# Patient Record
Sex: MALE | Race: WHITE | HISPANIC OR LATINO | Employment: OTHER | URBAN - METROPOLITAN AREA
[De-identification: names, ages, dates, MRNs, and addresses within clinical notes are randomized per-mention and may not be internally consistent; named-entity substitution may affect disease eponyms.]

---

## 2024-05-13 ENCOUNTER — OFFICE VISIT (OUTPATIENT)
Dept: PODIATRY | Facility: CLINIC | Age: 74
End: 2024-05-13
Payer: MEDICARE

## 2024-05-13 VITALS — WEIGHT: 210 LBS | BODY MASS INDEX: 26.95 KG/M2 | HEIGHT: 74 IN

## 2024-05-13 DIAGNOSIS — L60.9 DISEASE OF NAIL: Primary | ICD-10-CM

## 2024-05-13 PROCEDURE — 1159F MED LIST DOCD IN RCRD: CPT | Mod: CPTII,S$GLB,, | Performed by: PODIATRIST

## 2024-05-13 PROCEDURE — 99203 OFFICE O/P NEW LOW 30 MIN: CPT | Mod: S$GLB,,, | Performed by: PODIATRIST

## 2024-05-13 PROCEDURE — 1126F AMNT PAIN NOTED NONE PRSNT: CPT | Mod: CPTII,S$GLB,, | Performed by: PODIATRIST

## 2024-05-13 RX ORDER — FLUTICASONE PROPIONATE AND SALMETEROL 100; 50 UG/1; UG/1
POWDER RESPIRATORY (INHALATION)
COMMUNITY
Start: 2023-10-23 | End: 2024-10-23

## 2024-05-13 RX ORDER — LATANOPROST 50 UG/ML
1 SOLUTION/ DROPS OPHTHALMIC NIGHTLY
COMMUNITY
Start: 2024-05-03

## 2024-05-13 RX ORDER — POLYVINYL ALCOHOL, POVIDONE 14; 6 MG/ML; MG/ML
SOLUTION/ DROPS OPHTHALMIC
COMMUNITY
Start: 2024-02-16

## 2024-05-13 RX ORDER — CARBOXYMETHYLCELLULOSE SODIUM 10 MG/ML
GEL OPHTHALMIC
COMMUNITY
Start: 2024-05-03

## 2024-05-13 RX ORDER — CICLOPIROX 80 MG/ML
SOLUTION TOPICAL NIGHTLY
Qty: 6.6 ML | Refills: 2 | Status: SHIPPED | OUTPATIENT
Start: 2024-05-13 | End: 2024-08-11

## 2024-05-13 RX ORDER — MONTELUKAST SODIUM 10 MG/1
10 TABLET ORAL
COMMUNITY
Start: 2024-02-26

## 2024-05-13 RX ORDER — BRIMONIDINE TARTRATE 2 MG/ML
SOLUTION/ DROPS OPHTHALMIC
COMMUNITY
Start: 2024-05-03

## 2024-05-13 RX ORDER — FLUOROMETHOLONE 1 MG/ML
SUSPENSION/ DROPS OPHTHALMIC
COMMUNITY
Start: 2024-05-03

## 2024-05-13 RX ORDER — DOXYCYCLINE 50 MG/1
50 CAPSULE ORAL 2 TIMES DAILY
COMMUNITY
Start: 2024-01-26

## 2024-05-13 RX ORDER — TIMOLOL MALEATE 5 MG/ML
1 SOLUTION/ DROPS OPHTHALMIC 2 TIMES DAILY
COMMUNITY
Start: 2024-05-03

## 2024-05-13 RX ORDER — PRAZOSIN HYDROCHLORIDE 1 MG/1
CAPSULE ORAL
COMMUNITY
Start: 2023-10-23

## 2024-05-13 RX ORDER — CETIRIZINE HYDROCHLORIDE 10 MG/1
TABLET ORAL
COMMUNITY
Start: 2023-10-23 | End: 2024-10-23

## 2024-05-13 RX ORDER — ACETAZOLAMIDE 250 MG/1
500 TABLET ORAL 2 TIMES DAILY
COMMUNITY
Start: 2024-02-29

## 2024-05-13 RX ORDER — DICLOFENAC SODIUM 10 MG/G
GEL TOPICAL 4 TIMES DAILY
COMMUNITY
Start: 2024-01-26

## 2024-05-13 RX ORDER — LIFITEGRAST 50 MG/ML
SOLUTION/ DROPS OPHTHALMIC
COMMUNITY
Start: 2024-01-29

## 2024-05-13 RX ORDER — COLCHICINE 0.6 MG/1
CAPSULE ORAL
COMMUNITY
Start: 2023-12-15

## 2024-05-13 RX ORDER — ALBUTEROL SULFATE 90 UG/1
AEROSOL, METERED RESPIRATORY (INHALATION)
COMMUNITY
Start: 2023-10-23

## 2024-05-13 RX ORDER — MOXIFLOXACIN 5 MG/ML
SOLUTION/ DROPS OPHTHALMIC
COMMUNITY
Start: 2024-02-16

## 2024-05-13 RX ORDER — PREDNISOLONE ACETATE 10 MG/ML
SUSPENSION/ DROPS OPHTHALMIC
COMMUNITY
Start: 2024-01-26

## 2024-05-13 RX ORDER — METHYLPREDNISOLONE 4 MG/1
TABLET ORAL
COMMUNITY
Start: 2024-01-11

## 2024-05-13 RX ORDER — MELOXICAM 7.5 MG/1
7.5 TABLET ORAL
COMMUNITY
Start: 2024-04-11

## 2024-06-01 RX ORDER — DEXAMETHASONE SODIUM PHOSPHATE 4 MG/ML
4 INJECTION, SOLUTION INTRA-ARTICULAR; INTRALESIONAL; INTRAMUSCULAR; INTRAVENOUS; SOFT TISSUE
Status: DISCONTINUED | OUTPATIENT
Start: 2024-05-13 | End: 2024-06-02

## 2024-06-01 RX ORDER — LIDOCAINE HYDROCHLORIDE 10 MG/ML
1 INJECTION INFILTRATION; PERINEURAL
Status: DISCONTINUED | OUTPATIENT
Start: 2024-05-13 | End: 2024-06-02

## 2024-06-02 NOTE — PROGRESS NOTES
Subjective:     Patient ID: Gilson Cooper is a 73 y.o. male.    Chief Complaint: Nail Problem    Gilson is a 73 y.o. male who presents to the clinic complaining of thick and discolored toenails on both feet. Gilson is inquiring about treatment options.    Review of Systems   Constitutional: Negative for chills and fever.   Cardiovascular:  Negative for chest pain and leg swelling.   Respiratory:  Negative for cough and shortness of breath.    Gastrointestinal:  Negative for diarrhea, nausea and vomiting.        Objective:     Physical Exam  Vitals reviewed.   Constitutional:       General: He is not in acute distress.     Appearance: Normal appearance. He is not ill-appearing.   HENT:      Head: Normocephalic.      Nose: Nose normal.   Pulmonary:      Effort: Pulmonary effort is normal. No respiratory distress.   Skin:     Capillary Refill: Capillary refill takes 2 to 3 seconds.   Neurological:      Mental Status: He is alert and oriented to person, place, and time.   Psychiatric:         Mood and Affect: Mood normal.         Behavior: Behavior normal.         Thought Content: Thought content normal.     Neurologic: Protective and light touch sensation intact bilateral lower extremity   Dermatologic:  Left foot nail plates 1 through 5 bilateral are thickened and discolored, no open lesions noted bilateral foot   Vascular: DP and PT pulses palpable 2/4 bilateral foot, capillary fill time less 3 seconds to distal digits bilateral foot   Musculoskeletal: No pain and tenderness with palpation bilateral foot, ankle joint range of motion is mildly reduced without pain, no masses noted bilateral foot      Assessment:      Encounter Diagnosis   Name Primary?    Disease of nail Yes     Plan:     Gilson was seen today for nail problem.    Diagnoses and all orders for this visit:    Disease of nail    Other orders  -     ciclopirox (PENLAC) 8 % Soln; Apply topically nightly.  -     Cancel: Tendon Sheath  -      Discontinue: dexAMETHasone injection 4 mg  -     Discontinue: LIDOcaine HCL 10 mg/ml (1%) injection 1 mL      I counseled the patient on his conditions, their implications and medical management.        1. Patient was examined and evaluated.    2. Discussed with patient the etiology of nail fungus and as possible secondary issue to prior nail trauma.  Discussed with patient OTC topical conservative treatments such as Vicks vapor rub, tea tree oil as well as coconut oil.  Discussed with patient risks and benefits oral Lamisil therapy.   3. Patient will follow-up p.r.n. for complaints

## 2024-06-02 NOTE — PROGRESS NOTES
Subjective:     Patient ID: Gilson Cooper is a 73 y.o. male.    Chief Complaint: Nail Problem    Gilson is a 73 y.o. male who presents to the clinic complaining of heel pain in the right foot, especially with the first step in the morning. The pain is described as Grabbing, Tight, and Sharp. The onset of the pain was gradual and has worsened over the past several weeks. Gilson rates the pain as 9/10. He denies a history of trauma. Prior treatments include none.    Review of Systems   Constitutional: Negative for chills and fever.   Cardiovascular:  Negative for chest pain and leg swelling.   Respiratory:  Negative for cough and shortness of breath.    Gastrointestinal:  Negative for diarrhea, nausea and vomiting.   Neurological:  Positive for paresthesias.        Objective:     Physical Exam  Vitals reviewed.   Constitutional:       General: He is not in acute distress.     Appearance: Normal appearance. He is not ill-appearing.   HENT:      Head: Normocephalic.      Nose: Nose normal.   Cardiovascular:      Rate and Rhythm: Normal rate.   Pulmonary:      Effort: Pulmonary effort is normal. No respiratory distress.   Skin:     Capillary Refill: Capillary refill takes 2 to 3 seconds.   Neurological:      Mental Status: He is alert and oriented to person, place, and time.   Psychiatric:         Mood and Affect: Mood normal.         Behavior: Behavior normal.         Thought Content: Thought content normal.       Musculoskeletal: 5/5 muscle strength noted bilateral foot, ankle joint range of motion is reduced without pain, pain tenderness with palpation of the right calcaneal medial tubercle and medial band of the right plantar fascia  Neurologic: Protective and light touch sensation intact bilateral lower extremity, positive paresthesias report   Dermatologic:  Nails 1 through 5 bilateral are thickened and discolored, no open lesions noted bilateral foot, no rashes noted bilateral foot no interdigital  maceration noted bilateral foot   Vascular: DP and PT pulses 2/4 bilateral foot, capillary fill time less 3 seconds to distal aspect of the digits bilateral    Assessment:      Encounter Diagnoses   Name Primary?    Disease of nail Yes    Plantar fasciitis     Pain of right lower extremity      Plan:     Gilson was seen today for nail problem.    Diagnoses and all orders for this visit:    Disease of nail    Plantar fasciitis  -     Tendon Sheath: R plantar fascia    Pain of right lower extremity    Other orders  -     ciclopirox (PENLAC) 8 % Soln; Apply topically nightly.      I counseled the patient on his conditions, their implications and medical management.        1. Patient was examined and evaluated.    2. Discussed with patient etiology of plantar fasciitis.  Conservative treatment options were discussed in detail with the patient.  Stretching instructions were demonstrated for the patient.  Stretching instructions with then printed/ dispensed to the patient for home reference.  Discussed with patient possible benefits of local corticosteroid injection or oral Medrol Dosepak should pain complaints worsen over time.  Patient was advised to adjunct with OTC analgesics/NSAIDs for pain relief.  Patient was advised to ice and elevate the lower extremity at end of days.  Tendon Sheath    Date/Time: 5/13/2024 1:45 PM    Performed by: Vik Fountain DPM  Authorized by: Vik Fountain DPM    Location:  Foot      3. Discussed with patient the etiology of nail fungus and as possible secondary issue to prior nail trauma.  Discussed with patient OTC topical conservative treatments such as Vicks vapor rub, tea tree oil as well as coconut oil.  Discussed with patient risks and benefits oral Lamisil therapy.   4. Patient will follow-up in 2 weeks or p.r.n. for complaints